# Patient Record
Sex: MALE | Race: BLACK OR AFRICAN AMERICAN | NOT HISPANIC OR LATINO | Employment: FULL TIME | URBAN - METROPOLITAN AREA
[De-identification: names, ages, dates, MRNs, and addresses within clinical notes are randomized per-mention and may not be internally consistent; named-entity substitution may affect disease eponyms.]

---

## 2023-02-13 ENCOUNTER — HOSPITAL ENCOUNTER (EMERGENCY)
Facility: HOSPITAL | Age: 33
Discharge: HOME | End: 2023-02-13
Attending: GENERAL ACUTE CARE HOSPITAL

## 2023-02-13 VITALS
RESPIRATION RATE: 20 BRPM | DIASTOLIC BLOOD PRESSURE: 98 MMHG | HEIGHT: 74 IN | BODY MASS INDEX: 31.8 KG/M2 | WEIGHT: 247.81 LBS | SYSTOLIC BLOOD PRESSURE: 153 MMHG | TEMPERATURE: 99 F | HEART RATE: 88 BPM | OXYGEN SATURATION: 97 %

## 2023-02-13 DIAGNOSIS — J02.0 STREPTOCOCCAL PHARYNGITIS: Primary | ICD-10-CM

## 2023-02-13 LAB
FLUAV AG UPPER RESP QL IA.RAPID: NOT DETECTED
FLUBV AG UPPER RESP QL IA.RAPID: NOT DETECTED
MONO SCR (OHS): NEGATIVE
SARS-COV-2 RNA RESP QL NAA+PROBE: NOT DETECTED
STREP A PCR (OHS): DETECTED

## 2023-02-13 PROCEDURE — 25000003 PHARM REV CODE 250: Performed by: GENERAL ACUTE CARE HOSPITAL

## 2023-02-13 PROCEDURE — 63600175 PHARM REV CODE 636 W HCPCS: Mod: JG | Performed by: GENERAL ACUTE CARE HOSPITAL

## 2023-02-13 PROCEDURE — 0240U COVID/FLU A&B PCR: CPT | Performed by: GENERAL ACUTE CARE HOSPITAL

## 2023-02-13 PROCEDURE — 96372 THER/PROPH/DIAG INJ SC/IM: CPT | Performed by: GENERAL ACUTE CARE HOSPITAL

## 2023-02-13 PROCEDURE — 86308 HETEROPHILE ANTIBODY SCREEN: CPT | Performed by: GENERAL ACUTE CARE HOSPITAL

## 2023-02-13 PROCEDURE — 99284 EMERGENCY DEPT VISIT MOD MDM: CPT

## 2023-02-13 PROCEDURE — 87651 STREP A DNA AMP PROBE: CPT | Performed by: GENERAL ACUTE CARE HOSPITAL

## 2023-02-13 RX ORDER — IBUPROFEN 400 MG/1
800 TABLET ORAL
Status: COMPLETED | OUTPATIENT
Start: 2023-02-13 | End: 2023-02-13

## 2023-02-13 RX ORDER — ACETAMINOPHEN 500 MG
1000 TABLET ORAL
Status: DISCONTINUED | OUTPATIENT
Start: 2023-02-13 | End: 2023-02-13

## 2023-02-13 RX ADMIN — PENICILLIN G BENZATHINE 1.2 MILLION UNITS: 1200000 INJECTION, SUSPENSION INTRAMUSCULAR at 06:02

## 2023-02-13 RX ADMIN — IBUPROFEN 800 MG: 400 TABLET ORAL at 06:02

## 2023-02-13 NOTE — ED PROVIDER NOTES
Encounter Date: 2/13/2023       History     Chief Complaint   Patient presents with    Sore Throat     Pt c/o sore throat since Wednesday night.     Patient came in emergency room with chief complaints of sore throat and difficulty of swallowing for 4 days, no fever, reports that he was treated with cough medications    Review of patient's allergies indicates:  No Known Allergies  History reviewed. No pertinent past medical history.  History reviewed. No pertinent surgical history.  History reviewed. No pertinent family history.     Review of Systems   HENT:  Positive for sore throat.    All other systems reviewed and are negative.    Physical Exam     Initial Vitals [02/13/23 0519]   BP Pulse Resp Temp SpO2   (!) 153/98 88 20 98.5 °F (36.9 °C) 97 %      MAP       --         Physical Exam    Nursing note and vitals reviewed.  Constitutional: He appears well-developed and well-nourished.   HENT:   Right Ear: External ear normal.   Left Ear: External ear normal.   Mouth/Throat: Oropharyngeal exudate and posterior oropharyngeal erythema present.   Eyes: Conjunctivae are normal. Pupils are equal, round, and reactive to light.   Neck: Neck supple.   Normal range of motion.  Cardiovascular:  Normal rate and regular rhythm.           Pulmonary/Chest: Breath sounds normal.   Abdominal: Abdomen is soft. Bowel sounds are normal.   Musculoskeletal:         General: Normal range of motion.      Cervical back: Normal range of motion and neck supple.     Neurological: He is alert and oriented to person, place, and time.   Skin: Skin is warm. Capillary refill takes 2 to 3 seconds.   Psychiatric: He has a normal mood and affect. His behavior is normal. Thought content normal.       ED Course   Procedures  Labs Reviewed   STREP GROUP A BY PCR - Abnormal; Notable for the following components:       Result Value    STREP A PCR ([facility]) Detected (*)     All other components within normal limits    Narrative:     The Xpert Xpress Strep A  test is a rapid, qualitative in vitro diagnostic test for the detection of Streptococcus pyogenes (Group A ß-hemolytic Streptococcus, Strep A) in throat swab specimens from patients with signs and symptoms of pharyngitis.     COVID/FLU A&B PCR   MONONUCLEOSIS SCREEN          Imaging Results    None          Medications   acetaminophen tablet 1,000 mg (has no administration in time range)   penicillin G benzathine (BICILLIN LA) injection 1.2 Million Units (has no administration in time range)   ibuprofen tablet 800 mg (has no administration in time range)     Medical Decision Making:   Initial Assessment:   Patient came in emergency room with chief complaints of sore throat and difficulty of swallowing for 4 days, no fever, reports that he was treated with cough medications, physical exam revealed no pain distress, there is pharyngeal erythema with bilateral tonsillar enlargement and exudate, lymphadenopathies bilateral submandibular, rest of exam is normal  Differential Diagnosis:   Streptococcal pharyngitis, COVID, flu, mononucleosis  Clinical Tests:   Lab Tests: Ordered and Reviewed       <> Summary of Lab: Patient tested positive for strep  ED Management:  Patient received Bicillin 1.2 million units                          Clinical Impression:   Final diagnoses:  [J02.0] Streptococcal pharyngitis (Primary)        ED Disposition Condition    Discharge Stable          ED Prescriptions    None       Follow-up Information       Follow up With Specialties Details Why Contact Info    Rene Hawthorne MD Family Medicine   [address]  [place] [address]  [phone]               Jessi Carvalho MD  02/13/23 0609

## 2023-06-10 ENCOUNTER — HOSPITAL ENCOUNTER (EMERGENCY)
Facility: HOSPITAL | Age: 33
Discharge: HOME | End: 2023-06-10
Attending: INTERNAL MEDICINE

## 2023-06-10 VITALS
OXYGEN SATURATION: 98 % | SYSTOLIC BLOOD PRESSURE: 168 MMHG | HEART RATE: 94 BPM | BODY MASS INDEX: 32.08 KG/M2 | HEIGHT: 74 IN | TEMPERATURE: 98 F | DIASTOLIC BLOOD PRESSURE: 83 MMHG | WEIGHT: 250 LBS | RESPIRATION RATE: 18 BRPM

## 2023-06-10 DIAGNOSIS — R31.9 HEMATURIA, UNSPECIFIED TYPE: Primary | ICD-10-CM

## 2023-06-10 LAB
APPEARANCE UR: CLEAR
BILIRUB UR QL STRIP.AUTO: NEGATIVE MG/DL
COLOR UR: YELLOW
GLUCOSE UR QL STRIP.AUTO: NEGATIVE MG/DL
KETONES UR QL STRIP.AUTO: NEGATIVE MG/DL
LEUKOCYTE ESTERASE UR QL STRIP.AUTO: NEGATIVE UNIT/L
NITRITE UR QL STRIP.AUTO: NEGATIVE
PH UR STRIP.AUTO: 6.5 [PH]
PROT UR QL STRIP.AUTO: NEGATIVE MG/DL
RBC UR QL AUTO: NEGATIVE UNIT/L
SP GR UR STRIP.AUTO: 1.02
UROBILINOGEN UR STRIP-ACNC: 0.2 MG/DL

## 2023-06-10 PROCEDURE — 99282 EMERGENCY DEPT VISIT SF MDM: CPT

## 2023-06-10 PROCEDURE — 81003 URINALYSIS AUTO W/O SCOPE: CPT | Performed by: INTERNAL MEDICINE

## 2023-06-10 NOTE — ED PROVIDER NOTES
Encounter Date: 6/10/2023  History from patient     History     Chief Complaint   Patient presents with    Hematuria     Pt states he passed he passed some blood clots in his urine last night. Denies pain or dysuria.     HPI    32 y.o. male  has a past medical history of GI bleed. Presenting with  Hematuria (Pt states he passed he passed some blood clots in his urine last night. Denies pain or dysuria.)      Review of patient's allergies indicates:  No Known Allergies  Past Medical History:   Diagnosis Date    GI bleed      Past Surgical History:   Procedure Laterality Date    ESOPHAGOGASTRODUODENOSCOPY      Erosive gastritis     Family History   Problem Relation Age of Onset    Cancer Mother     Breast cancer Mother      Social History     Tobacco Use    Smoking status: Every Day     Types: Cigarettes, Vaping with nicotine    Smokeless tobacco: Never   Substance Use Topics    Alcohol use: Yes     Comment: Hard liquor    Drug use: Never     Review of Systems   HENT:  Negative for trouble swallowing and voice change.    Eyes:  Negative for visual disturbance.   Respiratory:  Negative for cough and shortness of breath.    Cardiovascular:  Negative for chest pain.   Gastrointestinal:  Negative for abdominal pain, diarrhea and vomiting.   Genitourinary:  Positive for hematuria. Negative for dysuria.        1 episode after having sex   Musculoskeletal:  Negative for gait problem.        No Pain.   Skin:  Negative for color change and rash.   Neurological:  Negative for headaches.   Psychiatric/Behavioral:  Negative for behavioral problems and sleep disturbance.    All other systems reviewed and are negative.    Physical Exam     Initial Vitals [06/10/23 1034]   BP Pulse Resp Temp SpO2   (!) 168/83 94 18 98.3 °F (36.8 °C) 98 %      MAP       --         Physical Exam    Nursing note and vitals reviewed.  Constitutional: He appears well-developed and well-nourished. No distress.   HENT:   Head: Atraumatic.   Eyes: EOM are  normal.   Cardiovascular:  Normal heart sounds.           Pulmonary/Chest: Breath sounds normal.   Abdominal: Abdomen is soft. Bowel sounds are normal.   Musculoskeletal:         General: Normal range of motion.      Cervical back: No bony tenderness.     Neurological: He is alert.   Speech Normal   Skin: Skin is dry.   Psychiatric: He has a normal mood and affect.   Pleasant       ED Course   Procedures    Orders Placed This Encounter   Procedures    Urinalysis, Reflex to Urine Culture     Medications - No data to display  Admission on 06/10/2023   Component Date Value Ref Range Status    Color, UA 06/10/2023 Yellow  Yellow, Light-Yellow, Dark Yellow, Vira, Straw Final    Appearance, UA 06/10/2023 Clear  Clear Final    Specific Gravity, UA 06/10/2023 1.025   Final    pH, UA 06/10/2023 6.5  5.0 - 8.5 Final    Protein, UA 06/10/2023 Negative  Negative mg/dL Final    Glucose, UA 06/10/2023 Negative  Negative, Normal mg/dL Final    Ketones, UA 06/10/2023 Negative  Negative mg/dL Final    Blood, UA 06/10/2023 Negative  Negative unit/L Final    Bilirubin, UA 06/10/2023 Negative  Negative mg/dL Final    Urobilinogen, UA 06/10/2023 0.2  0.2, 1.0, Normal mg/dL Final    Nitrites, UA 06/10/2023 Negative  Negative Final    Leukocyte Esterase, UA 06/10/2023 Negative  Negative unit/L Final       Labs Reviewed   URINALYSIS, REFLEX TO URINE CULTURE          Imaging Results    None          Medications - No data to display  Medical Decision Making:   Initial Assessment:   32 y.o. male  has a past medical history of GI bleed. Presenting with  Hematuria (Pt states he passed he passed some blood clots in his urine last night. Denies pain or dysuria.)    Patient essentially had the sex and then he went to urinate and passed a small blood clot, he took the picture of it and brought it with him, he did not have anymore blood since then, he does not have any symptoms since then, no urethral discharge, no pain, no fever no nausea no  vomiting no back pain no flank pain and he is back to being perfectly normal.  He had GI bleed 1 time in the past due to drinking excessively but is not drinking that much anymore, he still drinks hard liquor.  He did drink alcohol yesterday.    His urine is perfectly normal today    So it appears like he did have some urethral injury which caused the blood clot but now he does not have anymore symptoms or blood anymore.  I have advised him that he can just watch it for the time being in case he gets hematuria again he must go see a urologist I will provide him telephone numbers for a couple of urologist, if he does not get blood in the urine anymore he does not have to bother about it anymore.                        Clinical Impression:   Final diagnoses:  [R31.9] Hematuria, unspecified type (Primary)        ED Disposition Condition    Discharge Stable          ED Prescriptions    None       Follow-up Information       Follow up With Specialties Details Why Contact Info    Rene Hawthorne MD Family Medicine In 2 days  [address]  [place] [address]  [phone]      Urologist                 Kayla Garcia MD  06/10/23 6325

## 2023-06-10 NOTE — DISCHARGE INSTRUCTIONS
Take medicines as prescribed    See your family doctor in one to 2 days for further evaluation, workup, and treatment as necessary    Avoid driving or operating machinery while taking medicines as some medicines might cause drowsiness and may cause problems. Also pain medicines have potential of being addictive  so use Pain meds specially Narcotics Sparingly.    The exam and treatment you received in Emergency Room was for an urgent problem and NOT INTENDED AS COMPLETE CARE. It is important that you FOLLOW UP with a doctor for ongoing care. If your symptoms become WORSE or you DO NOT IMPROVE and you are unable to reach your health care provider, you should RETURN to the emergency department. The Emergency Room doctor has provided a PRELIMINARY INTERPRETATION of all your STUDIES. A final interpretation may be done after you are discharged. IF A CHANGE in your diagnosis or treatment is needed WE WILL CONTACT YOU. It is critical that we have a CURRENT PHONE NUMBER FOR YOU.        Take medicines as prescribed, see a urologist for follow-up, further workup, and treatment as needed.    Return to emergency room in case you develop fever, vomiting, with pain together    See a family doctor to refer to a urologist    Carlos Farmer MD  [address] [facility] [place]  [id number]      Ozzy Delatorre MD.  [address] [facility] [place]  [address]  [phone]

## 2025-03-04 ENCOUNTER — HOSPITAL ENCOUNTER (EMERGENCY)
Facility: HOSPITAL | Age: 35
Discharge: HOME | End: 2025-03-04
Payer: COMMERCIAL

## 2025-03-04 VITALS
SYSTOLIC BLOOD PRESSURE: 143 MMHG | RESPIRATION RATE: 18 BRPM | HEIGHT: 74 IN | WEIGHT: 250 LBS | TEMPERATURE: 99 F | OXYGEN SATURATION: 98 % | DIASTOLIC BLOOD PRESSURE: 96 MMHG | BODY MASS INDEX: 32.08 KG/M2 | HEART RATE: 79 BPM

## 2025-03-04 DIAGNOSIS — S39.012A BACK STRAIN, INITIAL ENCOUNTER: ICD-10-CM
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
## 2025-03-04 DIAGNOSIS — S16.1XXA CERVICAL STRAIN, ACUTE, INITIAL ENCOUNTER: ICD-10-CM

## 2025-03-04 PROCEDURE — 99284 EMERGENCY DEPT VISIT MOD MDM: CPT | Mod: 25

## 2025-03-04 PROCEDURE — 25000003 PHARM REV CODE 250: Performed by: NURSE PRACTITIONER

## 2025-03-04 RX ORDER — HYDROCODONE BITARTRATE AND ACETAMINOPHEN 10; 325 MG/1; MG/1
1 TABLET ORAL
Refills: 0 | Status: COMPLETED | OUTPATIENT
Start: 2025-03-04 | End: 2025-03-04

## 2025-03-04 RX ORDER — TIZANIDINE 4 MG/1
4 TABLET ORAL EVERY 6 HOURS PRN
Qty: 20 TABLET | Refills: 0 | Status: SHIPPED | OUTPATIENT
Start: 2025-03-04 | End: 2025-03-14

## 2025-03-04 RX ORDER — IBUPROFEN 800 MG/1
800 TABLET ORAL EVERY 6 HOURS PRN
Qty: 20 TABLET | Refills: 0 | Status: SHIPPED | OUTPATIENT
Start: 2025-03-04

## 2025-03-04 RX ADMIN — HYDROCODONE BITARTRATE AND ACETAMINOPHEN 1 TABLET: 10; 325 TABLET ORAL at 06:03

## 2025-03-04 NOTE — Clinical Note
"Garth Grahamkaur" Vimalced was seen and treated in our emergency department on 3/4/2025.  He may return to work on 03/10/2025.       If you have any questions or concerns, please don't hesitate to call.      Melani Blanco, WHITP"
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
